# Patient Record
Sex: MALE | Race: WHITE | NOT HISPANIC OR LATINO | URBAN - METROPOLITAN AREA
[De-identification: names, ages, dates, MRNs, and addresses within clinical notes are randomized per-mention and may not be internally consistent; named-entity substitution may affect disease eponyms.]

---

## 2018-04-10 ENCOUNTER — EMERGENCY (EMERGENCY)
Facility: HOSPITAL | Age: 63
LOS: 1 days | Discharge: HOME | End: 2018-04-10
Attending: EMERGENCY MEDICINE | Admitting: EMERGENCY MEDICINE
Payer: OTHER MISCELLANEOUS

## 2018-04-10 VITALS
TEMPERATURE: 98 F | DIASTOLIC BLOOD PRESSURE: 94 MMHG | RESPIRATION RATE: 18 BRPM | SYSTOLIC BLOOD PRESSURE: 143 MMHG | HEART RATE: 82 BPM | OXYGEN SATURATION: 98 %

## 2018-04-10 PROCEDURE — 99282 EMERGENCY DEPT VISIT SF MDM: CPT
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
## 2018-04-10 NOTE — ED PROVIDER NOTE - LOCATION
"Chief Complaint   Patient presents with     Back Pain     c/o deep, mid right back pain x 2 months, does have problems with UTI's, denies discoloration in urine     Consult     brother recently dx wth stomach Ca, would like to discuss having labs done-H-pylori-no current GI symptoms     Arthritis     gout L) great toe       Initial BP (!) 126/92 (BP Location: Left arm, Patient Position: Chair, Cuff Size: Adult Large)  Pulse 84  Temp 98.3  F (36.8  C) (Tympanic)  Resp 16  SpO2 95% Estimated body mass index is 31.4 kg/(m^2) as calculated from the following:    Height as of 3/9/15: 5' 10.5\" (1.791 m).    Weight as of 10/28/16: 222 lb (100.7 kg).  Medication Reconciliation: complete     Josselin Montano      "
face
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
## 2019-05-24 NOTE — ED PROVIDER NOTE - MOUTH
1x1.5cm area of swelling over the body of jaw, no bony tenderness in jaw, normal bite test, no bleeding in mouth, no pain underneath tongue
Self/Parent(s)
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
## 2025-06-10 NOTE — ED PROVIDER NOTE - SKIN, MLM
Detail Level: Detailed
Otc Regimen: Start to use Amlactin/ Cerave / Aveeno OTC
Skin normal color for race, warm, dry and intact. No evidence of rash.